# Patient Record
Sex: FEMALE | Race: WHITE | NOT HISPANIC OR LATINO | URBAN - METROPOLITAN AREA
[De-identification: names, ages, dates, MRNs, and addresses within clinical notes are randomized per-mention and may not be internally consistent; named-entity substitution may affect disease eponyms.]

---

## 2018-03-02 ENCOUNTER — AMBULATORY CONVERSION ENCOUNTER (OUTPATIENT)
Dept: FAMILY MEDICINE | Facility: CLINIC | Age: 43
End: 2018-03-02

## 2018-03-06 ENCOUNTER — TELEPHONE (OUTPATIENT)
Dept: FAMILY MEDICINE | Facility: CLINIC | Age: 43
End: 2018-03-06

## 2018-03-06 NOTE — TELEPHONE ENCOUNTER
Please let her know all labs are coming out normal including iron studies, thyroid, vit D and B12 and lyme titer

## 2018-05-21 ENCOUNTER — TELEPHONE (OUTPATIENT)
Dept: FAMILY MEDICINE | Facility: CLINIC | Age: 43
End: 2018-05-21

## 2018-05-21 RX ORDER — FLUOXETINE 20 MG/1
20 TABLET ORAL DAILY
Qty: 90 TABLET | Refills: 0 | Status: SHIPPED | OUTPATIENT
Start: 2018-05-21 | End: 2018-09-07 | Stop reason: SDUPTHER

## 2018-05-21 RX ORDER — FLUOXETINE 20 MG/1
20 TABLET ORAL
COMMUNITY
Start: 2017-10-30 | End: 2018-05-21 | Stop reason: SDUPTHER

## 2018-09-10 RX ORDER — FLUOXETINE 20 MG/1
TABLET ORAL
Qty: 90 TABLET | Refills: 0 | Status: SHIPPED | OUTPATIENT
Start: 2018-09-10 | End: 2019-01-15 | Stop reason: SDUPTHER

## 2018-10-31 ENCOUNTER — OFFICE VISIT (OUTPATIENT)
Dept: FAMILY MEDICINE | Facility: CLINIC | Age: 43
End: 2018-10-31
Payer: COMMERCIAL

## 2018-10-31 VITALS
SYSTOLIC BLOOD PRESSURE: 120 MMHG | RESPIRATION RATE: 16 BRPM | HEART RATE: 72 BPM | BODY MASS INDEX: 24.3 KG/M2 | OXYGEN SATURATION: 98 % | TEMPERATURE: 98.2 F | DIASTOLIC BLOOD PRESSURE: 62 MMHG | WEIGHT: 154.8 LBS | HEIGHT: 67 IN

## 2018-10-31 DIAGNOSIS — Z00.00 ROUTINE MEDICAL EXAM: Primary | ICD-10-CM

## 2018-10-31 DIAGNOSIS — G43.009 MIGRAINE WITHOUT AURA AND WITHOUT STATUS MIGRAINOSUS, NOT INTRACTABLE: ICD-10-CM

## 2018-10-31 DIAGNOSIS — Z23 NEED FOR VACCINATION: ICD-10-CM

## 2018-10-31 DIAGNOSIS — F41.9 ANXIETY: ICD-10-CM

## 2018-10-31 PROCEDURE — 99396 PREV VISIT EST AGE 40-64: CPT | Mod: 25 | Performed by: NURSE PRACTITIONER

## 2018-10-31 PROCEDURE — 90686 IIV4 VACC NO PRSV 0.5 ML IM: CPT | Performed by: NURSE PRACTITIONER

## 2018-10-31 PROCEDURE — 90471 IMMUNIZATION ADMIN: CPT | Performed by: NURSE PRACTITIONER

## 2018-10-31 RX ORDER — BUTALBITAL, ACETAMINOPHEN AND CAFFEINE 50; 325; 40 MG/1; MG/1; MG/1
1 TABLET ORAL EVERY 6 HOURS PRN
Qty: 15 TABLET | Refills: 0 | Status: SHIPPED | OUTPATIENT
Start: 2018-10-31 | End: 2019-11-06 | Stop reason: SDUPTHER

## 2018-10-31 RX ORDER — ZOLPIDEM TARTRATE 5 MG/1
5 TABLET ORAL NIGHTLY PRN
COMMUNITY
Start: 2018-01-26 | End: 2019-05-21 | Stop reason: ALTCHOICE

## 2018-10-31 RX ORDER — SUMATRIPTAN SUCCINATE 50 MG/1
50 TABLET ORAL ONCE AS NEEDED
Qty: 12 TABLET | Refills: 0 | Status: SHIPPED | OUTPATIENT
Start: 2018-10-31 | End: 2019-11-06 | Stop reason: SDUPTHER

## 2018-10-31 RX ORDER — SUMATRIPTAN SUCCINATE 50 MG/1
50 TABLET ORAL ONCE AS NEEDED
COMMUNITY
Start: 2018-01-23 | End: 2018-10-31 | Stop reason: SDUPTHER

## 2018-10-31 RX ORDER — BUTALBITAL, ACETAMINOPHEN AND CAFFEINE 50; 325; 40 MG/1; MG/1; MG/1
TABLET ORAL
COMMUNITY
Start: 2018-01-23 | End: 2018-10-31 | Stop reason: SDUPTHER

## 2018-10-31 ASSESSMENT — ENCOUNTER SYMPTOMS
TROUBLE SWALLOWING: 0
CHILLS: 0
FATIGUE: 0
BRUISES/BLEEDS EASILY: 0
COUGH: 0
LIGHT-HEADEDNESS: 0
PALPITATIONS: 0
NECK STIFFNESS: 0
ABDOMINAL PAIN: 0
ADENOPATHY: 0
DIZZINESS: 0
NECK PAIN: 0
SHORTNESS OF BREATH: 0
FEVER: 0
PHOTOPHOBIA: 0
ARTHRALGIAS: 0
HEADACHES: 1
DYSURIA: 0

## 2018-10-31 NOTE — PROGRESS NOTES
"Subjective      Patient ID: Rehana Torres is a 43 y.o. female.  1975      Patient presents for a physical.  Diet is healthy, well-balanced.   Does not formally exercise.  Works full-time as a  at home.   with 3 kids.    Menses is regular, sees [facility] and completed pap 2 weeks ago.  Mammogram completed and due again in February.  Sleeps well at night.    Migraines: Migraines on and off, may be 1-2 months a month and then other months does not get a migraine at all.  Uses imitrex and fioricet PRN.      Anxiety:  Symptoms well-controlled, no side effects.          The following have been reviewed and updated as appropriate in this visit:  Tobacco  Allergies  Meds  Problems  Surg Hx  Fam Hx  Soc Hx      Review of Systems   Constitutional: Negative for chills, fatigue and fever.   HENT: Negative for trouble swallowing.         Dental exam up to date   Eyes: Negative for photophobia and visual disturbance.        Wears glasses/contacts, up to date with eye exam.   Respiratory: Negative for cough and shortness of breath.    Cardiovascular: Negative for chest pain, palpitations and leg swelling.   Gastrointestinal: Negative for abdominal pain.   Genitourinary: Negative for dysuria and urgency.   Musculoskeletal: Negative for arthralgias, neck pain and neck stiffness.   Skin: Negative for rash.   Neurological: Positive for headaches. Negative for dizziness and light-headedness.   Hematological: Negative for adenopathy. Does not bruise/bleed easily.       Objective     Vitals:    10/31/18 1114   BP: 120/62   BP Location: Right upper arm   Patient Position: Sitting   Pulse: 72   Resp: 16   Temp: 36.8 °C (98.2 °F)   TempSrc: Oral   SpO2: 98%   Weight: 70.2 kg (154 lb 12.8 oz)   Height: 1.702 m (5' 7\")     Body mass index is 24.25 kg/m².    Physical Exam   Constitutional: She appears well-developed and well-nourished.   HENT:   Head: Normocephalic and atraumatic.   Right Ear: " Tympanic membrane, external ear and ear canal normal.   Left Ear: Tympanic membrane, external ear and ear canal normal.   Nose: Nose normal.   Mouth/Throat: Oropharynx is clear and moist. No oropharyngeal exudate.   Eyes: Conjunctivae and EOM are normal.   Neck: Normal range of motion. Neck supple.   Cardiovascular: Normal rate, regular rhythm, normal heart sounds and intact distal pulses.    Pulmonary/Chest: Effort normal and breath sounds normal.   Abdominal: Soft. Bowel sounds are normal. There is no tenderness.   Musculoskeletal: Normal range of motion.   Lymphadenopathy:     She has no cervical adenopathy.   Neurological: She is alert.   Skin: Skin is warm and dry.   Psychiatric: She has a normal mood and affect. Her behavior is normal. Judgment and thought content normal.       Assessment/Plan   Problem List Items Addressed This Visit     Migraine     Uses Imitrex PRN and then uses Fioricet if no relief.         Relevant Medications    SUMAtriptan (IMITREX) 50 mg tablet    butalbital-acetaminophen-caff (FIORICET, ESGIC) -40 mg per tablet    Anxiety     Stable on prozac 20mg,  follow up 1 year.           Other Visit Diagnoses     Routine medical exam    -  Primary    Doing well. Labs completed last year and were normal.  Flu shot administered. Up to date with gyn, mammo, dental and eye.    Need for vaccination        Relevant Orders    Influenza vaccine quadrivalent preservative free 6 mon and older IM (FluLaval) (Completed)

## 2018-10-31 NOTE — PATIENT INSTRUCTIONS
Continue with healthy diet and exercise.  Try to get at least 30-40 minutes of aerobic exercise daily.  Up  To date with dental and eye exams.  Up to date with gyn/mammo.  Up to date with immunizations: Flu, Tdap due for next year   Follow up in 1 year.

## 2019-05-21 ENCOUNTER — APPOINTMENT (OUTPATIENT)
Dept: LAB | Age: 44
End: 2019-05-21
Attending: NURSE PRACTITIONER
Payer: COMMERCIAL

## 2019-05-21 ENCOUNTER — OFFICE VISIT (OUTPATIENT)
Dept: FAMILY MEDICINE | Facility: CLINIC | Age: 44
End: 2019-05-21
Payer: COMMERCIAL

## 2019-05-21 ENCOUNTER — HOSPITAL ENCOUNTER (OUTPATIENT)
Dept: RADIOLOGY | Age: 44
Discharge: HOME | End: 2019-05-21
Attending: NURSE PRACTITIONER
Payer: COMMERCIAL

## 2019-05-21 VITALS
WEIGHT: 155.8 LBS | BODY MASS INDEX: 24.45 KG/M2 | HEART RATE: 77 BPM | HEIGHT: 67 IN | SYSTOLIC BLOOD PRESSURE: 130 MMHG | OXYGEN SATURATION: 98 % | RESPIRATION RATE: 16 BRPM | DIASTOLIC BLOOD PRESSURE: 72 MMHG | TEMPERATURE: 98.2 F

## 2019-05-21 DIAGNOSIS — R20.2 NUMBNESS AND TINGLING OF RIGHT ARM: Primary | ICD-10-CM

## 2019-05-21 DIAGNOSIS — R20.0 NUMBNESS AND TINGLING OF RIGHT ARM: ICD-10-CM

## 2019-05-21 DIAGNOSIS — M54.6 ACUTE RIGHT-SIDED THORACIC BACK PAIN: ICD-10-CM

## 2019-05-21 DIAGNOSIS — R20.2 NUMBNESS AND TINGLING OF RIGHT ARM: ICD-10-CM

## 2019-05-21 DIAGNOSIS — M54.2 NECK PAIN: ICD-10-CM

## 2019-05-21 DIAGNOSIS — R20.0 NUMBNESS AND TINGLING OF RIGHT ARM: Primary | ICD-10-CM

## 2019-05-21 LAB
25(OH)D3 SERPL-MCNC: 35 NG/ML (ref 30–100)
ALBUMIN SERPL-MCNC: 4.4 G/DL (ref 3.4–5)
ALP SERPL-CCNC: 31 IU/L (ref 35–126)
ALT SERPL-CCNC: 12 IU/L (ref 11–54)
ANION GAP SERPL CALC-SCNC: 10 MEQ/L (ref 3–15)
AST SERPL-CCNC: 15 IU/L (ref 15–41)
BILIRUB SERPL-MCNC: 0.6 MG/DL (ref 0.3–1.2)
BUN SERPL-MCNC: 9 MG/DL (ref 8–20)
CALCIUM SERPL-MCNC: 9.3 MG/DL (ref 8.9–10.3)
CHLORIDE SERPL-SCNC: 104 MEQ/L (ref 98–109)
CO2 SERPL-SCNC: 25 MEQ/L (ref 22–32)
CREAT SERPL-MCNC: 0.8 MG/DL
ERYTHROCYTE [DISTWIDTH] IN BLOOD BY AUTOMATED COUNT: 14.1 % (ref 11.7–14.4)
GFR SERPL CREATININE-BSD FRML MDRD: >60 ML/MIN/1.73M*2
GLUCOSE SERPL-MCNC: 77 MG/DL (ref 70–99)
HCT VFR BLDCO AUTO: 39.1 %
HGB BLD-MCNC: 12.8 G/DL
MCH RBC QN AUTO: 30.2 PG (ref 28–33.2)
MCHC RBC AUTO-ENTMCNC: 32.7 G/DL (ref 32.2–35.5)
MCV RBC AUTO: 92.2 FL (ref 83–98)
PDW BLD AUTO: 12.3 FL (ref 9.4–12.3)
PLATELET # BLD AUTO: 230 K/UL
POTASSIUM SERPL-SCNC: 3.8 MEQ/L (ref 3.6–5.1)
PROT SERPL-MCNC: 6.6 G/DL (ref 6–8.2)
RBC # BLD AUTO: 4.24 M/UL (ref 3.93–5.22)
SODIUM SERPL-SCNC: 139 MEQ/L (ref 136–144)
TSH SERPL DL<=0.05 MIU/L-ACNC: 4.33 MIU/L (ref 0.34–5.6)
VIT B12 SERPL-MCNC: 391 PG/ML (ref 180–914)
WBC # BLD AUTO: 4.68 K/UL

## 2019-05-21 PROCEDURE — 72050 X-RAY EXAM NECK SPINE 4/5VWS: CPT

## 2019-05-21 PROCEDURE — 72070 X-RAY EXAM THORAC SPINE 2VWS: CPT

## 2019-05-21 PROCEDURE — 36415 COLL VENOUS BLD VENIPUNCTURE: CPT

## 2019-05-21 PROCEDURE — 80053 COMPREHEN METABOLIC PANEL: CPT

## 2019-05-21 PROCEDURE — 82607 VITAMIN B-12: CPT

## 2019-05-21 PROCEDURE — 99213 OFFICE O/P EST LOW 20 MIN: CPT | Performed by: NURSE PRACTITIONER

## 2019-05-21 PROCEDURE — 84443 ASSAY THYROID STIM HORMONE: CPT

## 2019-05-21 PROCEDURE — 85027 COMPLETE CBC AUTOMATED: CPT

## 2019-05-21 PROCEDURE — 82306 VITAMIN D 25 HYDROXY: CPT

## 2019-05-21 PROCEDURE — 86618 LYME DISEASE ANTIBODY: CPT

## 2019-05-21 ASSESSMENT — ENCOUNTER SYMPTOMS
FEVER: 0
PALPITATIONS: 0
NECK PAIN: 0
CHILLS: 0
COUGH: 0
ADENOPATHY: 0
BRUISES/BLEEDS EASILY: 0
TROUBLE SWALLOWING: 0
DIZZINESS: 0
NECK STIFFNESS: 0
SHORTNESS OF BREATH: 0
HEADACHES: 0
LIGHT-HEADEDNESS: 0
FATIGUE: 0

## 2019-05-21 NOTE — PROGRESS NOTES
Subjective      Patient ID: Rehana Torres is a 44 y.o. female.  1975      Patient presents for right arm tingling/numbness.  She reports symptoms started about 1 month.  She fracture her right great toe in February and was in a boot for while and then developed some neck pain.  She reports neck pain has since resolved, but she then started with right shoulder down to right hand tingling/numbness.  She reports symptoms were intermittent but now have been more constant.  She only has symptoms when walking or sitting up.   She also feels she has decreased  strength and did drop her coffee twice in [facility]. Patient is right handed and did start working full time in January and most sits at a computer.  She also has noticed that bilateral feet are also numb/tingling but that is intermittent. She also feels her right shoulder blade has a burning sensation.  She does have a history of B12 deficiency and previously had tingling/numbness.   She did try heat on the neck when she had neck pain otherwise has not tried anything OTC.            The following have been reviewed and updated as appropriate in this visit:  Allergies  Meds       Review of Systems   Constitutional: Negative for chills, fatigue and fever.   HENT: Negative for trouble swallowing.    Respiratory: Negative for cough and shortness of breath.    Cardiovascular: Negative for chest pain and palpitations.   Musculoskeletal: Negative for neck pain and neck stiffness.        Right shoulder blade burning pain and tingling numbness down right hand   Skin: Negative for rash.   Neurological: Negative for dizziness, light-headedness and headaches.   Hematological: Negative for adenopathy. Does not bruise/bleed easily.     @[email or web address]@  Past Medical History:   Diagnosis Date   • Broken toe 02/2019    Right great toe   • Migraines      Past Surgical History:   Procedure Laterality Date   • WISDOM TOOTH EXTRACTION       Social History     Social History   •  "Marital status:      Spouse name: N/A   • Number of children: N/A   • Years of education: N/A     Social History Main Topics   • Smoking status: Never Smoker   • Smokeless tobacco: Never Used   • Alcohol use Yes      Comment: socially   • Drug use: No   • Sexual activity: Yes     Partners: Male     Other Topics Concern   • None     Social History Narrative   • None     Objective     Vitals:    05/21/19 1256   BP: 130/72   BP Location: Left upper arm   Patient Position: Sitting   Pulse: 77   Resp: 16   Temp: 36.8 °C (98.2 °F)   TempSrc: Oral   SpO2: 98%   Weight: 70.7 kg (155 lb 12.8 oz)   Height: 1.702 m (5' 7\")     Body mass index is 24.4 kg/m².  Current Outpatient Prescriptions   Medication Sig Dispense Refill   • butalbital-acetaminophen-caff (FIORICET, ESGIC) -40 mg per tablet Take 1 tablet by mouth every 6 (six) hours as needed for headaches or migraine. 15 tablet 0   • SUMAtriptan (IMITREX) 50 mg tablet Take 1 tablet (50 mg total) by mouth once as needed for migraine. 12 tablet 0     No current facility-administered medications for this visit.        Physical Exam   Constitutional: She is oriented to person, place, and time. She appears well-developed and well-nourished.   HENT:   Head: Normocephalic and atraumatic.   Eyes: EOM are normal.   Neck: Normal range of motion.   Cardiovascular: Normal rate, regular rhythm and normal heart sounds.    Pulmonary/Chest: Effort normal.   Musculoskeletal: Normal range of motion.        Cervical back: She exhibits tenderness and pain. She exhibits no bony tenderness, no swelling, no edema, no deformity, no laceration, no spasm and normal pulse.        Thoracic back: She exhibits tenderness and pain. She exhibits no bony tenderness, no swelling, no edema, no deformity, no laceration, no spasm and normal pulse.        Back:    Strengths equal of BUE's; radial pulses equal; no temperature change noted;     Full ROM of cervical spine and right shoulder "   Neurological: She is alert and oriented to person, place, and time. She has normal strength. No cranial nerve deficit or sensory deficit.   Skin: Skin is warm and dry.   Psychiatric: She has a normal mood and affect. Her behavior is normal. Judgment and thought content normal.       Assessment/Plan     Problem List Items Addressed This Visit     None      Visit Diagnoses     Numbness and tingling of right arm    -  Primary    Etiology unclear? Will start by checking labs and xrays.      Relevant Orders    Comprehensive metabolic panel    CBC    Vitamin D 25 hydroxy    Vitamin B12    TSH w reflex FT4    Lyme EIA reflex WB    Neck pain        Etiology unclear? Will start by checking labs and xrays.      Relevant Orders    X-RAY CERVICAL SPINE COMPLETE 4 OR 5 VIEWS    Acute right-sided thoracic back pain        Etiology unclear? Will start by checking labs and xrays.      Relevant Orders    X-RAY THORACIC SPINE 2 VIEWS

## 2019-05-22 DIAGNOSIS — M50.30 DEGENERATIVE, INTERVERTEBRAL DISC, CERVICAL: Primary | ICD-10-CM

## 2019-05-22 LAB — B BURGDOR AB SER IA-ACNC: 0.33 RATIO

## 2019-05-22 RX ORDER — CYCLOBENZAPRINE HCL 10 MG
10 TABLET ORAL NIGHTLY PRN
Qty: 15 TABLET | Refills: 0 | Status: SHIPPED | OUTPATIENT
Start: 2019-05-22 | End: 2019-11-06 | Stop reason: ENTERED-IN-ERROR

## 2019-05-22 NOTE — PROGRESS NOTES
"Please let patient know that her xray of the thoracic spine shows some degenerative changes and a very subtle \"S\" shaped curve of her thoracic spine. "

## 2019-05-22 NOTE — PROGRESS NOTES
Please let patient know that xray of the cervical spine shows some straightening of the normal curve of her cervical spine which may be positional or related to a muscle spasm--we can try a muscle relaxer at night, but I would also like to see if we can get an MRI approved of cervical spine to get a better evaluation.  The muscle relaxer I would recommend would be flexeril 10mg at bedtime, may cause drowsiness.

## 2019-05-22 NOTE — PROGRESS NOTES
Please let patient know that overall her labs look good. Her B12 is at 391; but she is not deficient; she can try OTC B12 1000mcg daily to see if that helps    Lyme is still pending

## 2019-05-28 ENCOUNTER — TELEPHONE (OUTPATIENT)
Dept: FAMILY MEDICINE | Facility: CLINIC | Age: 44
End: 2019-05-28

## 2019-05-28 NOTE — TELEPHONE ENCOUNTER
LVM per Sue's note that MRI was denied and to try PT and if no improvements we will try to re-authorize an MRI

## 2019-05-28 NOTE — TELEPHONE ENCOUNTER
Please let patient that MRI was denied by her insurance; we can try physical therapy first to see if that will help; if still no improvement we can try to re-authorize MRI

## 2019-05-28 NOTE — TELEPHONE ENCOUNTER
MRI CERVICAL SPINE WO CONTRAST WAS DENIED:     Based on [facility] Spine Imaging Guidelines, we cannot approve this request. MRI might be supported in the evaluation of suspected or known spinal disease with one of the followin) failure to improve after a recent (within 3 months) 6 week trial of provider-directed treatment with clinical re-evaluation, or 2) any signs or symptoms such as significant motor weakness, malignancy, infection, cauda equina syndrome, for which conservative treatment is not needed. The clinical information received fails to support meeting these requirements and, therefore, the request is not indicated at this time.      Ordering doctor in [facility] is MLR. A [id number] can be done by calling [phone] using case # [id number] .

## 2019-05-30 ENCOUNTER — TELEPHONE (OUTPATIENT)
Dept: FAMILY MEDICINE | Facility: CLINIC | Age: 44
End: 2019-05-30

## 2019-05-30 DIAGNOSIS — M54.6 ACUTE RIGHT-SIDED THORACIC BACK PAIN: ICD-10-CM

## 2019-05-30 DIAGNOSIS — R20.0 NUMBNESS AND TINGLING OF RIGHT ARM: Primary | ICD-10-CM

## 2019-05-30 DIAGNOSIS — M54.2 NECK PAIN: ICD-10-CM

## 2019-05-30 DIAGNOSIS — R20.2 NUMBNESS AND TINGLING OF RIGHT ARM: Primary | ICD-10-CM

## 2019-05-30 NOTE — TELEPHONE ENCOUNTER
"Pt LVM @ 04:17; Pt states that she had gotten Anita missed call and states that she will go to PT but wants to know if she needs a script for it and where does Sue recommend that she go. Also, pt states that the numbness is increasing in her arms and states that she is now having numbness in here legs and feet. Pt states that she would to see a neurologist and wants to know where Sue recommends that she goes because she wants to \"get this figured out\"  "

## 2019-05-30 NOTE — TELEPHONE ENCOUNTER
TTP and referred her to [facility] for PT. Orders placed.     Neuro referrals given, patient reports that she does not need a referral in order to book appt. She will shop around for whom she wants to see.

## 2019-07-24 ENCOUNTER — TELEPHONE (OUTPATIENT)
Dept: FAMILY MEDICINE | Facility: CLINIC | Age: 44
End: 2019-07-24

## 2019-07-24 RX ORDER — FLUOXETINE HYDROCHLORIDE 20 MG/1
20 CAPSULE ORAL DAILY
Qty: 30 CAPSULE | Refills: 1 | Status: SHIPPED | OUTPATIENT
Start: 2019-07-24 | End: 2019-09-25 | Stop reason: SDUPTHER

## 2019-07-24 NOTE — TELEPHONE ENCOUNTER
Spoke with pt she states the reason she stopped her medication is because she was feeling better and her stress level had ultimately decreased and she admits that she realizes she should have just stayed on the medication knowing the ebs and flows of life. She would like to restart the medication due to life stresses ex (daughter recently diagnosed with scoliosis, her  has a chronic illness that he is up and down with, and also a great deal of things going on at work). Pt is aware we may start medication (Prozac 20 mg) and she may have to schedule a medication check in Sept

## 2019-07-24 NOTE — TELEPHONE ENCOUNTER
Elvie Thompson,    I will consult with Dr Green and Sue and see what they say and get back to you.

## 2019-07-24 NOTE — TELEPHONE ENCOUNTER
Melissa,     I called the patient to get her scheduled but the first openings aren't until September with [facility]. The patient doesn't want to wait this long and asked if this could be considered a same day since its effecting her day to day life. Please let me know what you think. Thanks.

## 2019-07-24 NOTE — TELEPHONE ENCOUNTER
Please schedule pt accordingly for appt, she would like to go back on a controlled medication she was taking previously and needs to be seen before we can do that.     Thanks

## 2019-07-24 NOTE — TELEPHONE ENCOUNTER
PLease let her know I sent in prozac, check in 1 month to let us know how she is doing and have her schedule routine physical if stable; if not will need sooner appt

## 2019-09-25 RX ORDER — FLUOXETINE HYDROCHLORIDE 20 MG/1
CAPSULE ORAL
Qty: 30 CAPSULE | Refills: 1 | Status: SHIPPED | OUTPATIENT
Start: 2019-09-25 | End: 2019-09-30 | Stop reason: SDUPTHER

## 2019-09-30 RX ORDER — FLUOXETINE HYDROCHLORIDE 20 MG/1
20 CAPSULE ORAL DAILY
Qty: 30 CAPSULE | Refills: 1 | Status: SHIPPED | OUTPATIENT
Start: 2019-09-30 | End: 2019-11-06 | Stop reason: SDUPTHER

## 2019-09-30 NOTE — TELEPHONE ENCOUNTER
Pt phoned in stating she just realized she needed a med check and the first available was November 6th, which she scheduled. Pt states she needs a refill now of the Prozac until her appt        Medicine Refill Request    Last Office Visit: 5/21/2019  Next Office Visit: 11/6/2019        Current Outpatient Prescriptions:   •  butalbital-acetaminophen-caff (FIORICET, ESGIC) -40 mg per tablet, Take 1 tablet by mouth every 6 (six) hours as needed for headaches or migraine., Disp: 15 tablet, Rfl: 0  •  cyclobenzaprine (FLEXERIL) 10 mg tablet, Take 1 tablet (10 mg total) by mouth nightly as needed for muscle spasms for up to 14 days., Disp: 15 tablet, Rfl: 0  •  FLUoxetine (PROzac) 20 mg capsule, TAKE 1 CAPSULE BY MOUTH EVERY DAY, Disp: 30 capsule, Rfl: 1  •  SUMAtriptan (IMITREX) 50 mg tablet, Take 1 tablet (50 mg total) by mouth once as needed for migraine., Disp: 12 tablet, Rfl: 0      BP Readings from Last 3 Encounters:   05/21/19 130/72   10/31/18 120/62       Recent Lab results:  Lab Results   Component Value Date    CHOL 176 07/29/2015   ,   Lab Results   Component Value Date    HDL 69 07/29/2015   ,   Lab Results   Component Value Date    LDLCALC 94 07/29/2015   ,   Lab Results   Component Value Date    TRIG 64 07/29/2015        Lab Results   Component Value Date    GLUCOSE 77 05/21/2019   ,   Lab Results   Component Value Date    HGBA1C 5.2 06/17/2016         Lab Results   Component Value Date    CREATININE 0.8 05/21/2019       Lab Results   Component Value Date    TSH 4.33 05/21/2019

## 2019-11-06 ENCOUNTER — OFFICE VISIT (OUTPATIENT)
Dept: FAMILY MEDICINE | Facility: CLINIC | Age: 44
End: 2019-11-06
Payer: COMMERCIAL

## 2019-11-06 VITALS
DIASTOLIC BLOOD PRESSURE: 78 MMHG | HEIGHT: 67 IN | WEIGHT: 155.4 LBS | TEMPERATURE: 98.1 F | OXYGEN SATURATION: 98 % | BODY MASS INDEX: 24.39 KG/M2 | SYSTOLIC BLOOD PRESSURE: 112 MMHG | HEART RATE: 76 BPM | RESPIRATION RATE: 16 BRPM

## 2019-11-06 DIAGNOSIS — F41.9 ANXIETY: ICD-10-CM

## 2019-11-06 DIAGNOSIS — Z00.00 ROUTINE PHYSICAL EXAMINATION: ICD-10-CM

## 2019-11-06 DIAGNOSIS — Z23 FLU VACCINE NEED: Primary | ICD-10-CM

## 2019-11-06 DIAGNOSIS — G43.009 MIGRAINE WITHOUT AURA AND WITHOUT STATUS MIGRAINOSUS, NOT INTRACTABLE: ICD-10-CM

## 2019-11-06 PROCEDURE — 90686 IIV4 VACC NO PRSV 0.5 ML IM: CPT | Performed by: FAMILY MEDICINE

## 2019-11-06 PROCEDURE — 90471 IMMUNIZATION ADMIN: CPT | Performed by: FAMILY MEDICINE

## 2019-11-06 PROCEDURE — 99213 OFFICE O/P EST LOW 20 MIN: CPT | Mod: 25 | Performed by: FAMILY MEDICINE

## 2019-11-06 RX ORDER — BUTALBITAL, ACETAMINOPHEN AND CAFFEINE 50; 325; 40 MG/1; MG/1; MG/1
1 TABLET ORAL EVERY 6 HOURS PRN
Qty: 15 TABLET | Refills: 1 | Status: SHIPPED | OUTPATIENT
Start: 2019-11-06 | End: 2022-05-10 | Stop reason: ALTCHOICE

## 2019-11-06 RX ORDER — SUMATRIPTAN SUCCINATE 50 MG/1
50 TABLET ORAL ONCE AS NEEDED
Qty: 12 TABLET | Refills: 1 | Status: SHIPPED | OUTPATIENT
Start: 2019-11-06 | End: 2024-01-30 | Stop reason: SDUPTHER

## 2019-11-06 RX ORDER — FLUOXETINE HYDROCHLORIDE 20 MG/1
20 CAPSULE ORAL DAILY
Qty: 30 CAPSULE | Refills: 3 | Status: SHIPPED | OUTPATIENT
Start: 2019-11-06 | End: 2020-07-17 | Stop reason: SDUPTHER

## 2019-11-06 ASSESSMENT — ENCOUNTER SYMPTOMS
RHINORRHEA: 0
EYE PAIN: 0
SORE THROAT: 0
WHEEZING: 0
FREQUENCY: 0
SINUS PAIN: 0
COUGH: 0
FEVER: 0
DIZZINESS: 0
HEADACHES: 1
BRUISES/BLEEDS EASILY: 0
PALPITATIONS: 0
CHILLS: 0
FATIGUE: 0
CHEST TIGHTNESS: 0
SINUS PRESSURE: 0
MYALGIAS: 0
SHORTNESS OF BREATH: 0
ARTHRALGIAS: 0
NERVOUS/ANXIOUS: 0
HEMATURIA: 0
ABDOMINAL PAIN: 0
DIARRHEA: 0
WEAKNESS: 0
CONSTIPATION: 0
SLEEP DISTURBANCE: 0

## 2019-11-06 NOTE — PROGRESS NOTES
Subjective      Patient ID: Rehana Torres is a 44 y.o. female.    She is here for med check. She has been doing ok overall. She does feel the headaches are coming back alittle more than her usual. Due for eye check next wk. She is not waiting to take any daily migraine med at this time. She feels she will get 3 migraines per month and will use either imitrex or fioricet which usually helps but sleeping it off is the best. She cannot say any new triggers but she is not eating as healthy and not as active as she should be. Needs refill on her meds.       The following have been reviewed and updated as appropriate in this visit:  Tobacco  Allergies  Meds  Problems  Med Hx  Surg Hx  Fam Hx       Review of Systems   Constitutional: Negative for chills, fatigue and fever.   HENT: Negative for ear pain, postnasal drip, rhinorrhea, sinus pressure, sinus pain and sore throat.    Eyes: Negative for pain and visual disturbance.   Respiratory: Negative for cough, chest tightness, shortness of breath and wheezing.    Cardiovascular: Negative for chest pain and palpitations.   Gastrointestinal: Negative for abdominal pain, constipation and diarrhea.   Genitourinary: Negative for decreased urine volume, frequency and hematuria.   Musculoskeletal: Negative for arthralgias and myalgias.   Skin: Negative for rash.   Neurological: Positive for headaches. Negative for dizziness and weakness.   Hematological: Does not bruise/bleed easily.   Psychiatric/Behavioral: Negative for behavioral problems, sleep disturbance and suicidal ideas. The patient is not nervous/anxious.        Current Outpatient Medications   Medication Sig Dispense Refill   • butalbital-acetaminophen-caff (FIORICET, ESGIC) -40 mg per tablet Take 1 tablet by mouth every 6 (six) hours as needed for headaches or migraine. 15 tablet 1   • FLUoxetine (PROzac) 20 mg capsule Take 1 capsule (20 mg total) by mouth daily. 30 capsule 3   • SUMAtriptan  (IMITREX) 50 mg tablet Take 1 tablet (50 mg total) by mouth once as needed for migraine. 12 tablet 1     No current facility-administered medications for this visit.      Past Medical History:   Diagnosis Date   • Broken toe 02/2019    Right great toe   • Migraines      Family History   Problem Relation Age of Onset   • Diabetes Biological Father    • Hypertension Biological Father    • No Known Problems Biological Mother    • No Known Problems Biological Sister    • Hyperlipidemia Maternal Grandmother    • Hypertension Maternal Grandfather    • No Known Problems Paternal Grandmother    • Lung cancer Paternal Grandfather      Past Surgical History:   Procedure Laterality Date   • WISDOM TOOTH EXTRACTION       Social History     Socioeconomic History   • Marital status:      Spouse name: Not on file   • Number of children: Not on file   • Years of education: Not on file   • Highest education level: Not on file   Occupational History   • Not on file   Social Needs   • Financial resource strain: Not on file   • Food insecurity:     Worry: Not on file     Inability: Not on file   • Transportation needs:     Medical: Not on file     Non-medical: Not on file   Tobacco Use   • Smoking status: Never Smoker   • Smokeless tobacco: Never Used   Substance and Sexual Activity   • Alcohol use: Yes     Comment: socially   • Drug use: No   • Sexual activity: Yes     Partners: Male   Lifestyle   • Physical activity:     Days per week: Not on file     Minutes per session: Not on file   • Stress: Not on file   Relationships   • Social connections:     Talks on phone: Not on file     Gets together: Not on file     Attends Islam service: Not on file     Active member of club or organization: Not on file     Attends meetings of clubs or organizations: Not on file     Relationship status: Not on file   • Intimate partner violence:     Fear of current or ex partner: Not on file     Emotionally abused: Not on file     Physically  "abused: Not on file     Forced sexual activity: Not on file   Other Topics Concern   • Not on file   Social History Narrative   • Not on file     No Known Allergies    Objective   Visit Vitals  /78 (BP Location: Right upper arm, Patient Position: Sitting)   Pulse 76   Temp 36.7 °C (98.1 °F) (Oral)   Resp 16   Ht 1.702 m (5' 7\")   Wt 70.5 kg (155 lb 6.4 oz)   SpO2 98%   BMI 24.34 kg/m²     Physical Exam   Constitutional: She is oriented to person, place, and time. She appears well-developed and well-nourished.   HENT:   Head: Normocephalic and atraumatic.   Right Ear: External ear normal.   Left Ear: External ear normal.   Eyes: Pupils are equal, round, and reactive to light. Conjunctivae and EOM are normal.   Neck: Normal range of motion. Neck supple. No thyromegaly present.   Cardiovascular: Normal rate, regular rhythm and normal heart sounds.   No murmur heard.  Pulmonary/Chest: Effort normal and breath sounds normal. She has no wheezes.   Abdominal: Soft. Bowel sounds are normal. There is no tenderness.   Musculoskeletal: Normal range of motion. She exhibits no tenderness.   Lymphadenopathy:     She has no cervical adenopathy.   Neurological: She is alert and oriented to person, place, and time. No cranial nerve deficit.   Skin: Skin is warm and dry. No rash noted.   Psychiatric: She has a normal mood and affect.   Nursing note and vitals reviewed.      Assessment/Plan   Problem List Items Addressed This Visit        Nervous    Migraine    Relevant Medications    FLUoxetine (PROzac) 20 mg capsule    butalbital-acetaminophen-caff (FIORICET, ESGIC) -40 mg per tablet    SUMAtriptan (IMITREX) 50 mg tablet       Other    Anxiety    Relevant Medications    FLUoxetine (PROzac) 20 mg capsule      Other Visit Diagnoses     Flu vaccine need    -  Primary    Relevant Orders    Influenza vaccine quadrivalent preservative free 6 mon and older IM (FluLaval) (Completed)    Routine physical examination        " Relevant Orders    Comprehensive metabolic panel    TSH 3rd Generation    CBC    Lipid panel      She is doing well overall. She would like to hold on any medication for the migraines which would be daily meds at this time and will go back to headache diary and keep log and have recheck in few months. Will check fasting labs before her upcoming physical.     Lauren Green, DO  11/6/2019

## 2020-02-21 ENCOUNTER — OFFICE VISIT (OUTPATIENT)
Dept: FAMILY MEDICINE | Facility: CLINIC | Age: 45
End: 2020-02-21
Payer: COMMERCIAL

## 2020-02-21 VITALS
OXYGEN SATURATION: 98 % | HEIGHT: 67 IN | BODY MASS INDEX: 24.3 KG/M2 | SYSTOLIC BLOOD PRESSURE: 120 MMHG | WEIGHT: 154.8 LBS | DIASTOLIC BLOOD PRESSURE: 70 MMHG | TEMPERATURE: 98 F | HEART RATE: 67 BPM | RESPIRATION RATE: 16 BRPM

## 2020-02-21 DIAGNOSIS — Z23 NEED FOR TDAP VACCINATION: ICD-10-CM

## 2020-02-21 DIAGNOSIS — G43.009 MIGRAINE WITHOUT AURA AND WITHOUT STATUS MIGRAINOSUS, NOT INTRACTABLE: ICD-10-CM

## 2020-02-21 DIAGNOSIS — F41.9 ANXIETY: ICD-10-CM

## 2020-02-21 DIAGNOSIS — Z00.00 ROUTINE PHYSICAL EXAMINATION: Primary | ICD-10-CM

## 2020-02-21 PROCEDURE — 90471 IMMUNIZATION ADMIN: CPT | Performed by: FAMILY MEDICINE

## 2020-02-21 PROCEDURE — 90715 TDAP VACCINE 7 YRS/> IM: CPT | Performed by: FAMILY MEDICINE

## 2020-02-21 PROCEDURE — 99396 PREV VISIT EST AGE 40-64: CPT | Mod: 25 | Performed by: FAMILY MEDICINE

## 2020-02-21 ASSESSMENT — ENCOUNTER SYMPTOMS
SORE THROAT: 0
CONSTIPATION: 0
SHORTNESS OF BREATH: 0
DIARRHEA: 0
PALPITATIONS: 0
RHINORRHEA: 0
EYE PAIN: 0
HEADACHES: 1
ABDOMINAL PAIN: 0
SINUS PRESSURE: 0
MYALGIAS: 0
NERVOUS/ANXIOUS: 0
BRUISES/BLEEDS EASILY: 0
FEVER: 0
CHEST TIGHTNESS: 0
HEMATURIA: 0
FREQUENCY: 0
COUGH: 0
WHEEZING: 0
ARTHRALGIAS: 0
DIZZINESS: 0
CHILLS: 0
SINUS PAIN: 0
FATIGUE: 0
WEAKNESS: 0

## 2020-02-21 NOTE — PROGRESS NOTES
Subjective      Patient ID: Rehana Torres is a 45 y.o. female.    She is here for physical. Pt has been doing well overall. She is following well balanced meals. Stays well hydrated. She is active with moving around but no formal activity. Average 7hrs of sleep a night. Normal BMs and urination. Normal menstrual cycles. UTD with gyn care. UTD with mammo. UTD with eye and dental exam. UTD with flu shot. Due for Tdap. Due for fasting labs. She is getting about 4 migraines a month and controlling them at this time.       The following have been reviewed and updated as appropriate in this visit:  Tobacco  Allergies  Meds  Problems  Med Hx  Surg Hx  Fam Hx       Review of Systems   Constitutional: Negative for chills, fatigue and fever.   HENT: Negative for ear pain, postnasal drip, rhinorrhea, sinus pressure, sinus pain and sore throat.    Eyes: Negative for pain and visual disturbance.   Respiratory: Negative for cough, chest tightness, shortness of breath and wheezing.    Cardiovascular: Negative for chest pain and palpitations.   Gastrointestinal: Negative for abdominal pain, constipation and diarrhea.   Genitourinary: Negative for decreased urine volume, frequency and hematuria.   Musculoskeletal: Negative for arthralgias and myalgias.   Skin: Negative for rash.   Neurological: Positive for headaches. Negative for dizziness and weakness.   Hematological: Does not bruise/bleed easily.   Psychiatric/Behavioral: Negative for behavioral problems. The patient is not nervous/anxious.        Current Outpatient Medications   Medication Sig Dispense Refill   • butalbital-acetaminophen-caff (FIORICET, ESGIC) -40 mg per tablet Take 1 tablet by mouth every 6 (six) hours as needed for headaches or migraine. 15 tablet 1   • FLUoxetine (PROzac) 20 mg capsule Take 1 capsule (20 mg total) by mouth daily. 30 capsule 3   • SUMAtriptan (IMITREX) 50 mg tablet Take 1 tablet (50 mg total) by mouth once as needed for  migraine. 12 tablet 1     No current facility-administered medications for this visit.      Past Medical History:   Diagnosis Date   • Broken toe 02/2019    Right great toe   • Migraines      Family History   Problem Relation Age of Onset   • Diabetes Biological Father    • Hypertension Biological Father    • No Known Problems Biological Mother    • No Known Problems Biological Sister    • Hyperlipidemia Maternal Grandmother    • Hypertension Maternal Grandfather    • No Known Problems Paternal Grandmother    • Lung cancer Paternal Grandfather      Past Surgical History:   Procedure Laterality Date   • WISDOM TOOTH EXTRACTION       Social History     Socioeconomic History   • Marital status:      Spouse name: Not on file   • Number of children: Not on file   • Years of education: Not on file   • Highest education level: Not on file   Occupational History   • Not on file   Social Needs   • Financial resource strain: Not on file   • Food insecurity:     Worry: Not on file     Inability: Not on file   • Transportation needs:     Medical: Not on file     Non-medical: Not on file   Tobacco Use   • Smoking status: Never Smoker   • Smokeless tobacco: Never Used   Substance and Sexual Activity   • Alcohol use: Yes     Comment: socially   • Drug use: No   • Sexual activity: Yes     Partners: Male   Lifestyle   • Physical activity:     Days per week: Not on file     Minutes per session: Not on file   • Stress: Not on file   Relationships   • Social connections:     Talks on phone: Not on file     Gets together: Not on file     Attends Jainism service: Not on file     Active member of club or organization: Not on file     Attends meetings of clubs or organizations: Not on file     Relationship status: Not on file   • Intimate partner violence:     Fear of current or ex partner: Not on file     Emotionally abused: Not on file     Physically abused: Not on file     Forced sexual activity: Not on file   Other Topics  "Concern   • Not on file   Social History Narrative   • Not on file     No Known Allergies    Objective   Visit Vitals  /70 (BP Location: Right upper arm, Patient Position: Sitting)   Pulse 67   Temp 36.7 °C (98 °F) (Oral)   Resp 16   Ht 1.702 m (5' 7\")   Wt 70.2 kg (154 lb 12.8 oz)   SpO2 98%   BMI 24.25 kg/m²     Physical Exam   Constitutional: She is oriented to person, place, and time. She appears well-developed and well-nourished.   HENT:   Head: Normocephalic and atraumatic.   Right Ear: External ear normal.   Left Ear: External ear normal.   Eyes: Pupils are equal, round, and reactive to light. Conjunctivae are normal.   Neck: Normal range of motion. Neck supple. No thyromegaly present.   Cardiovascular: Normal rate, regular rhythm and normal heart sounds.   No murmur heard.  Pulmonary/Chest: Effort normal and breath sounds normal. She has no wheezes.   Abdominal: Soft. Bowel sounds are normal. There is no tenderness.   Musculoskeletal: Normal range of motion. She exhibits no tenderness.   Lymphadenopathy:     She has no cervical adenopathy.   Neurological: She is alert and oriented to person, place, and time. No cranial nerve deficit.   Skin: Skin is warm and dry. No rash noted.   Psychiatric: She has a normal mood and affect.   Nursing note and vitals reviewed.      Assessment/Plan   Problem List Items Addressed This Visit        Nervous    Migraine       Other    Anxiety      Other Visit Diagnoses     Routine physical examination    -  Primary    Relevant Orders    Comprehensive metabolic panel    TSH 3rd Generation    CBC    Lipid panel    Need for Tdap vaccination        Relevant Orders    Tdap vaccine greater than or equal to 6yo IM (Completed)      She is doing well overall. Discussed diet and exercise. UTD with eye and dental exam. UTD with gyn care. Will check fasting labs. UTD with flu shot. Will get Tdap. She will cont prozac and she would like to cont to monitor her migraines and cont her " current medications and look into seeing neuro or trying daily med but for now will see how she feels over next few months.     Lauren Green, DO  2/21/2020

## 2020-03-15 ENCOUNTER — TELEPHONE (OUTPATIENT)
Dept: FAMILY MEDICINE | Facility: CLINIC | Age: 45
End: 2020-03-15

## 2020-03-15 RX ORDER — TOBRAMYCIN 3 MG/ML
2 SOLUTION/ DROPS OPHTHALMIC 3 TIMES DAILY
Qty: 5 ML | Refills: 0 | Status: SHIPPED | OUTPATIENT
Start: 2020-03-15 | End: 2020-03-22

## 2020-03-15 NOTE — TELEPHONE ENCOUNTER
ON CALL  Phone call with patient woke up this right eye crusty red and irritated.  She does wear contacts but did not sleep in them.  Will send drops to pharmacy  No contacts  Pt instructed if symptoms do not resolve she will need to be seen

## 2020-03-16 ENCOUNTER — TELEPHONE (OUTPATIENT)
Dept: FAMILY MEDICINE | Facility: CLINIC | Age: 45
End: 2020-03-16

## 2020-03-16 NOTE — TELEPHONE ENCOUNTER
Pt called and left a vm saying she has had a migraine for the past 3 days and is requesting something that will make her sleep. Pt said she has tried the Imitrex, but hasn't had any relief with that.     I left a vm asking pt to call back to clarify her PCP (if she is following Dr Green to [place] or changing to someone else in [place]) so that the message can be addressed by the correct provider.

## 2020-07-17 ENCOUNTER — TELEPHONE (OUTPATIENT)
Dept: PRIMARY CARE | Facility: CLINIC | Age: 45
End: 2020-07-17

## 2020-07-17 DIAGNOSIS — F41.9 ANXIETY: ICD-10-CM

## 2020-07-17 RX ORDER — FLUOXETINE HYDROCHLORIDE 20 MG/1
20 CAPSULE ORAL DAILY
Qty: 90 CAPSULE | Refills: 1 | Status: SHIPPED | OUTPATIENT
Start: 2020-07-17 | End: 2021-01-18

## 2020-07-17 NOTE — TELEPHONE ENCOUNTER
Do you have enough medication for the next 5 days?: No    Did you request this medication through your pharmacy or our patient portal in the last day or two? Yes    Name of medication requested: Fluoxetine  Medication Strength: 20mg  Mediation Directions: 1 per day  Quantity Requested (example 30/90): 30  Number of refills requested: 1      System Generated Preferred Pharmacy(s)  are as follows.  If more than one pharmacy displays please identify which one should be used for this call    Has the pharmacy information below been confirmed with the patient?  Yes      [id number] [place] - [address] AT [place] [address]  [place] [address]  Phone: [phone] Fax: [phone]           If necessary, provide pharmacy details below. N/A     Is this pharmacy a mail order pharmacy?:   Pharmacy Name:   Pharmacy City:   Pharmacy Telephone:     Additional Comments: Patient is staying with Dr. Green in \[facility]\""    Next Encounter with this provider: Visit date not found

## 2020-11-02 ENCOUNTER — PATIENT OUTREACH (OUTPATIENT)
Dept: CASE MANAGEMENT | Facility: CLINIC | Age: 45
End: 2020-11-02

## 2020-11-02 ENCOUNTER — TELEPHONE (OUTPATIENT)
Dept: PRIMARY CARE | Facility: CLINIC | Age: 45
End: 2020-11-02

## 2020-11-02 NOTE — TELEPHONE ENCOUNTER
Mrs Torres said she was hospitalized in [facility] last week for spinal fusion and had a CT scan completed; on the CT scan they found that she had Renal Cancer. Mrs. Torres just wanted to make Dr. Green aware of what's going and would be happy to schedule a TeleMed appointment if necessary.

## 2020-11-02 NOTE — TELEPHONE ENCOUNTER
Patient is now scheduled for Telemedicine appointment with Dr. Green on Wednesday, 11/4/20 at 10:00 AM. Patient was made aware to expect a phone call from medical assistant 15 minutes prior to appointment time. Patient stated understanding.

## 2020-11-02 NOTE — PROGRESS NOTES
SAÚL Shaffer [facility] Rehana ELLSWORTH Brian 1975 10/25/2020 M84.48XA Pathological fracture, other site, initial encounter for fracture 10/30/2020 Stable burst fracture of unspecified lumbar vertebra, initial encounter for closed fracture Discharged to home or self-care (routine discharge)     TCM # 1. No answer. Left message on machine with R N contact information.

## 2020-11-03 NOTE — PROGRESS NOTES
NAME: Rehana Torres    MRN: [id number]    YOB: 1975    EVENT DETAILS    Discharging Facility: [facility]  Date of Admission: 10/25/20  Date of Discharge: 10/30/20            Reason for Admission: Lumbar Burst Fracture, Left Renal Mass     I made 2 attempts for a TCM post hospital discharge outreach to this patient. The attempts were made within the 48 hour period post D/C. I was not able to speak with the patient but the attempts satisfied the initial requirement for transitions of care.     TCM appointment scheduled for 11/04/2020.    PLAN OF CARE:    RN phone number given for future care management needs.       Danna Glez RN

## 2020-11-04 ENCOUNTER — TELEMEDICINE (OUTPATIENT)
Dept: PRIMARY CARE | Facility: CLINIC | Age: 45
End: 2020-11-04
Payer: COMMERCIAL

## 2020-11-04 DIAGNOSIS — N28.89 RENAL MASS, LEFT: Primary | ICD-10-CM

## 2020-11-04 DIAGNOSIS — S32.010D COMPRESSION FRACTURE OF L1 VERTEBRA WITH ROUTINE HEALING: ICD-10-CM

## 2020-11-04 PROCEDURE — 99496 TRANSJ CARE MGMT HIGH F2F 7D: CPT | Mod: 95 | Performed by: FAMILY MEDICINE

## 2020-11-04 RX ORDER — DIAZEPAM 5 MG/1
5 TABLET ORAL
COMMUNITY
Start: 2020-10-30 | End: 2020-11-06

## 2020-11-04 RX ORDER — GABAPENTIN 300 MG/1
300 CAPSULE ORAL 3 TIMES DAILY
COMMUNITY
Start: 2020-10-30 | End: 2020-11-29

## 2020-11-04 RX ORDER — PREDNISONE 20 MG/1
TABLET ORAL
COMMUNITY
Start: 2020-10-30 | End: 2022-05-10 | Stop reason: ALTCHOICE

## 2020-11-04 RX ORDER — OXYCODONE HYDROCHLORIDE 5 MG/1
5 TABLET ORAL
COMMUNITY
Start: 2020-10-30 | End: 2020-11-09

## 2020-11-04 RX ORDER — ACETAMINOPHEN 500 MG
1000 TABLET ORAL EVERY 6 HOURS
COMMUNITY
Start: 2020-10-30 | End: 2020-11-09

## 2020-11-04 RX ORDER — CELECOXIB 200 MG/1
200 CAPSULE ORAL 2 TIMES DAILY
COMMUNITY
Start: 2020-10-30 | End: 2020-11-09

## 2020-11-04 ASSESSMENT — ENCOUNTER SYMPTOMS
SINUS PRESSURE: 0
CHEST TIGHTNESS: 0
DIARRHEA: 0
COUGH: 0
SLEEP DISTURBANCE: 0
PALPITATIONS: 0
EYE PAIN: 0
ABDOMINAL PAIN: 0
SINUS PAIN: 0
HEMATURIA: 0
WHEEZING: 0
SORE THROAT: 0
FREQUENCY: 0
RHINORRHEA: 0
FEVER: 0
BRUISES/BLEEDS EASILY: 0
MYALGIAS: 0
SHORTNESS OF BREATH: 0
HEADACHES: 0
FATIGUE: 0
ARTHRALGIAS: 0
CHILLS: 0
NERVOUS/ANXIOUS: 0
CONSTIPATION: 0
WEAKNESS: 0
DIZZINESS: 0

## 2020-11-04 NOTE — PROGRESS NOTES
Verification of Patient Location:  The patient affirms they are currently located in the following state:[place]     Request for Consent:  You and I are about to have a telemedicine check-in or visit. This is allowed because you are already my patient, and you have requested it.  This telemedicine visit will be billed to your health insurance or you, if you are self-insured.  You understand you will be responsible for any copayments or coinsurances that apply to your telemedicine visit.  Before starting our telemedicine visit, I am required to get your consent for this virtual check-in or visit by telemedicine. Do you consent?      Patient Response to Request for Consent: Yes    The following have been reviewed and updated as appropriate in this visit:  Tobacco  Allergies  Meds  Problems  Med Hx  Surg Hx  Fam Hx         Visit Documentation:    Subjective      Patient ID: Rehana Torres is a 45 y.o. female.    Called and spoke to patient and her  for [facility] follow up over the phone instead of OV due COVID. She was dealing with R lower back and was seeing chiropracter and did some adjustments and then got R hip pain and did xray of lower spine and hip which looked ok and gave her prednisone and muscle relaxant but pain did not get better and went to ortho and was suppose to get hip injection but pain got worse with nausea and vomiting.  She to [facility] ER and had CT abdomen/pelvis which showed left renal cell carcinoma and a malignant destructive mass L1 vertebral body extending into spinal canal and right neural foramen. She was transferred to [facility] on 10/25 until 10/30.   She underwent MRI cervical/lumbar/thoracic spine and xrays. She underwent CT chest, which showed no evidence of metastatic disease in the chest, but showed the large lytic lesion in the L1 vertebral body causing mass effect on the central canal. On 10/27 she went to the OR with neurosurgery and underwent  spinal angiogram and laurel embolization of Right L1 and L2 segmental feeders and Left L1 feeders to mass. On 10/28 she underwent posterior T11-L2 decompression of tumor with Dr. Kuo of orthopedics.  She received a bone scan which revealed which showed no evidence of metastatic bone disease. She with be following up with oncologist Dr. Barahona on 11/6 along with Dr Go (urology). She is also planning to have kidney removed at end of Nov. Urology recommended an outpt genetics referral which she will plan to get done. She is home now and feeling much better since surgery. She is taking it easy.         The following have been reviewed and updated as appropriate in this visit:  Tobacco  Allergies  Meds  Problems  Med Hx  Surg Hx  Fam Hx       Review of Systems   Constitutional: Negative for chills, fatigue and fever.   HENT: Negative for ear pain, postnasal drip, rhinorrhea, sinus pressure, sinus pain and sore throat.    Eyes: Negative for pain and visual disturbance.   Respiratory: Negative for cough, chest tightness, shortness of breath and wheezing.    Cardiovascular: Negative for chest pain and palpitations.   Gastrointestinal: Negative for abdominal pain, constipation and diarrhea.   Genitourinary: Negative for decreased urine volume, frequency and hematuria.   Musculoskeletal: Negative for arthralgias and myalgias.   Skin: Negative for rash.   Neurological: Negative for dizziness, weakness and headaches.   Hematological: Does not bruise/bleed easily.   Psychiatric/Behavioral: Negative for behavioral problems, sleep disturbance and suicidal ideas. The patient is not nervous/anxious.        Current Outpatient Medications   Medication Sig Dispense Refill   • acetaminophen (TYLENOL) 500 mg tablet Take 1,000 mg by mouth every 6 hours.     • celecoxib (celeBREX) 200 mg capsule Take 200 mg by mouth 2 times daily.     • diazePAM (VALIUM) 5 mg tablet Take 5 mg by mouth.     • gabapentin (NEURONTIN) 300 mg  capsule Take 300 mg by mouth 3 times daily.     • oxyCODONE (ROXICODONE) 5 mg immediate release tablet Take 5 mg by mouth.     • predniSONE (DELTASONE) 20 mg tablet Take 4 tabs (80mg) daily for 3 days, 3 tabs (60mg) daily for 3 days, then take 2 tabs (40mg) daily for 3 days, then take 1 tab (20mg) daily for 3 days.     • butalbital-acetaminophen-caff (FIORICET, ESGIC) -40 mg per tablet Take 1 tablet by mouth every 6 (six) hours as needed for headaches or migraine. 15 tablet 1   • FLUoxetine (PROzac) 20 mg capsule Take 1 capsule (20 mg total) by mouth daily. 90 capsule 1   • SUMAtriptan (IMITREX) 50 mg tablet Take 1 tablet (50 mg total) by mouth once as needed for migraine. 12 tablet 1     No current facility-administered medications for this visit.      Past Medical History:   Diagnosis Date   • Broken toe 02/2019    Right great toe   • Migraines      Family History   Problem Relation Age of Onset   • Diabetes Biological Father    • Hypertension Biological Father    • No Known Problems Biological Mother    • No Known Problems Biological Sister    • Hyperlipidemia Maternal Grandmother    • Hypertension Maternal Grandfather    • No Known Problems Paternal Grandmother    • Lung cancer Paternal Grandfather      Past Surgical History:   Procedure Laterality Date   • WISDOM TOOTH EXTRACTION       Social History     Socioeconomic History   • Marital status:      Spouse name: Not on file   • Number of children: Not on file   • Years of education: Not on file   • Highest education level: Not on file   Occupational History   • Not on file   Social Needs   • Financial resource strain: Not on file   • Food insecurity     Worry: Not on file     Inability: Not on file   • Transportation needs     Medical: Not on file     Non-medical: Not on file   Tobacco Use   • Smoking status: Never Smoker   • Smokeless tobacco: Never Used   Substance and Sexual Activity   • Alcohol use: Yes     Comment: socially   • Drug use: No    • Sexual activity: Yes     Partners: Male   Lifestyle   • Physical activity     Days per week: Not on file     Minutes per session: Not on file   • Stress: Not on file   Relationships   • Social connections     Talks on phone: Not on file     Gets together: Not on file     Attends Anglican service: Not on file     Active member of club or organization: Not on file     Attends meetings of clubs or organizations: Not on file     Relationship status: Not on file   • Intimate partner violence     Fear of current or ex partner: Not on file     Emotionally abused: Not on file     Physically abused: Not on file     Forced sexual activity: Not on file   Other Topics Concern   • Not on file   Social History Narrative   • Not on file     No Known Allergies    Objective   There were no vitals taken for this visit.  Physical Exam  Constitutional:       Appearance: She is well-developed.   Neck:      Thyroid: No thyromegaly.         Assessment/Plan   Problem List Items Addressed This Visit        Genitourinary    Renal mass, left - Primary       Musculoskeletal    Compression fracture of L1 vertebra with routine healing      She is home now and doing well overall. She has to go back for L kidney removal given 40% of tumor still there at end of month. She will cont follow up with her specialists and keep me updated. I will see her in f/u in 2 months or sooner if needed. She will get the genetic testing done as well. Cont current meds at this time.     Lauren Green DO  11/4/2020        Time Spent in Medical Discussion During This Encounter:  30 minutes

## 2020-12-04 ENCOUNTER — PATIENT OUTREACH (OUTPATIENT)
Dept: CASE MANAGEMENT | Facility: CLINIC | Age: 45
End: 2020-12-04

## 2020-12-08 NOTE — PROGRESS NOTES
NAME: Rehana Torres    MRN: [id number]    YOB: 1975    Event Review:          Discharge Diagnosis: Renal Cancer    Patient readmitted in the last 30 days: ([facility])     Date of Admission: 12/02/20  Date of Discharge: 12/03/20        2 messages left for pt with no return call. Pt admitted for elective left radical nephrectomy for renal cell cancer. Pt to f/u with oncology, urology, ortho and radiation oncology.     Nathalie Marion RN  [phone]

## 2021-04-13 DIAGNOSIS — Z23 ENCOUNTER FOR IMMUNIZATION: ICD-10-CM

## 2021-07-22 ENCOUNTER — TELEPHONE (OUTPATIENT)
Dept: PRIMARY CARE | Facility: CLINIC | Age: 46
End: 2021-07-22

## 2021-07-23 NOTE — TELEPHONE ENCOUNTER
LVM to call office, please put through. Per Dr. Green, schedule PE in the next few months, ok to override her schedule.

## 2021-07-26 NOTE — TELEPHONE ENCOUNTER
Looks like patient called to schedule and call center scheduled her for January even though my notes said to put her through to schedule in the next few months. Are you ok with her waiting until January or should I call her back and get her in sooner?

## 2022-05-10 ENCOUNTER — OFFICE VISIT (OUTPATIENT)
Dept: PRIMARY CARE | Facility: CLINIC | Age: 47
End: 2022-05-10
Payer: COMMERCIAL

## 2022-05-10 VITALS
OXYGEN SATURATION: 98 % | HEIGHT: 67 IN | DIASTOLIC BLOOD PRESSURE: 80 MMHG | HEART RATE: 111 BPM | RESPIRATION RATE: 20 BRPM | WEIGHT: 145 LBS | BODY MASS INDEX: 22.76 KG/M2 | TEMPERATURE: 97.9 F | SYSTOLIC BLOOD PRESSURE: 102 MMHG

## 2022-05-10 DIAGNOSIS — F41.9 ANXIETY: ICD-10-CM

## 2022-05-10 DIAGNOSIS — C79.51: ICD-10-CM

## 2022-05-10 DIAGNOSIS — C79.51 BONE METASTASES: ICD-10-CM

## 2022-05-10 DIAGNOSIS — Z00.00 ROUTINE PHYSICAL EXAMINATION: Primary | ICD-10-CM

## 2022-05-10 DIAGNOSIS — C64.2 RENAL CELL CARCINOMA OF LEFT KIDNEY (CMS/HCC): ICD-10-CM

## 2022-05-10 DIAGNOSIS — G43.009 MIGRAINE WITHOUT AURA AND WITHOUT STATUS MIGRAINOSUS, NOT INTRACTABLE: ICD-10-CM

## 2022-05-10 PROBLEM — N28.89 RENAL MASS, LEFT: Status: RESOLVED | Noted: 2020-10-25 | Resolved: 2022-05-10

## 2022-05-10 PROCEDURE — 3008F BODY MASS INDEX DOCD: CPT | Performed by: FAMILY MEDICINE

## 2022-05-10 PROCEDURE — 99396 PREV VISIT EST AGE 40-64: CPT | Performed by: FAMILY MEDICINE

## 2022-05-10 RX ORDER — ALPRAZOLAM 0.25 MG/1
0.25 TABLET ORAL 2 TIMES DAILY PRN
COMMUNITY
Start: 2022-02-04 | End: 2024-05-23 | Stop reason: ALTCHOICE

## 2022-05-10 RX ORDER — TRAMADOL HYDROCHLORIDE 50 MG/1
TABLET ORAL
COMMUNITY
Start: 2022-03-02 | End: 2022-05-10 | Stop reason: ENTERED-IN-ERROR

## 2022-05-10 RX ORDER — OXYCODONE HYDROCHLORIDE 5 MG/1
TABLET ORAL
COMMUNITY
Start: 2022-04-27

## 2022-05-10 RX ORDER — DULOXETIN HYDROCHLORIDE 20 MG/1
CAPSULE, DELAYED RELEASE ORAL
COMMUNITY
Start: 2022-05-03 | End: 2024-05-23 | Stop reason: ALTCHOICE

## 2022-05-10 RX ORDER — LISINOPRIL 10 MG/1
TABLET ORAL
COMMUNITY
Start: 2022-04-12 | End: 2024-05-23 | Stop reason: SDUPTHER

## 2022-05-10 RX ORDER — CALCIUM CARBONATE/VITAMIN D3 500 MG-10
TABLET,CHEWABLE ORAL
COMMUNITY
Start: 2022-01-24

## 2022-05-10 RX ORDER — CYCLOBENZAPRINE HCL 5 MG
5 TABLET ORAL 3 TIMES DAILY PRN
COMMUNITY
Start: 2021-09-15 | End: 2024-05-23 | Stop reason: ALTCHOICE

## 2022-05-10 ASSESSMENT — ENCOUNTER SYMPTOMS
RHINORRHEA: 0
CONSTIPATION: 0
DYSURIA: 0
FEVER: 0
ABDOMINAL PAIN: 0
HEMATURIA: 0
PALPITATIONS: 0
COUGH: 0
CHILLS: 0
ARTHRALGIAS: 0
SHORTNESS OF BREATH: 0
NERVOUS/ANXIOUS: 0
MYALGIAS: 0
FREQUENCY: 0
BLOOD IN STOOL: 0
HEADACHES: 0
SLEEP DISTURBANCE: 0
DIZZINESS: 0
WHEEZING: 0
FATIGUE: 0
SORE THROAT: 0

## 2022-05-10 ASSESSMENT — PATIENT HEALTH QUESTIONNAIRE - PHQ9
SUM OF ALL RESPONSES TO PHQ9 QUESTIONS 1 & 2: 2
SUM OF ALL RESPONSES TO PHQ QUESTIONS 1-9: 2

## 2022-05-10 NOTE — PROGRESS NOTES
Subjective      Patient ID: Rehana Torres is a 47 y.o. female.    She is here for physical. She has been doing ok overall. She is trying to follow well balanced meals but does not have great appetite and has more food aversions in the night so tries to eat more during the day. Stays well hydrated. She is active with her household chores and active with her 3 children. Average 8hrs of sleep a night. Normal BMs and urination. Due for colonoscopy. Has scheduled mammo in 2 wks. UTD with eye and dental exam. UTD with immunizations. She was diagnosed with renal cell carcinoma back in 2020 and is s/p spinal fusion secondary to mets to the spine and has had radiation to rib x 2 along with 4 cycles of ipi/nivo and cabozantinib added more recently. She lately has been dealing with more back pain and oncologist has ordered MRI lumbar spine and pelvis given history of bone mets along with MRI cervical spine and thoracic spine. She is planning to go back to her spinal surgeon soon as well. She is seeing psychiatrist and has her on cymbalta recently but doesn't really like the medication and thinking about going back on prozac. UTD with labs. UTD with eye and dental exam. UTD with gyn care/mammo. Due for colonoscopy.       The following have been reviewed and updated as appropriate in this visit:   Tobacco  Allergies  Meds  Problems  Surg Hx  Fam Hx         Review of Systems   Constitutional: Negative for chills, fatigue and fever.   HENT: Negative for ear discharge, ear pain, postnasal drip, rhinorrhea and sore throat.    Respiratory: Negative for cough, shortness of breath and wheezing.    Cardiovascular: Negative for chest pain and palpitations.   Gastrointestinal: Negative for abdominal pain, blood in stool and constipation.   Genitourinary: Negative for dysuria, frequency and hematuria.   Musculoskeletal: Negative for arthralgias and myalgias.   Skin: Negative for rash.   Neurological: Negative for dizziness and  headaches.   Psychiatric/Behavioral: Negative for sleep disturbance and suicidal ideas. The patient is not nervous/anxious.        Current Outpatient Medications   Medication Sig Dispense Refill   • ALPRAZolam (XANAX) 0.25 mg tablet Take 0.25 mg by mouth 2 (two) times a day as needed.     • cabozantinib 20 mg tablet Take  20 mg daily     • calcium carbonate-vitamin D3 500 mg-10 mcg (400 unit) chewable tablet CHEW 1 TABLET BY MOUTH EVERY DAY     • cyclobenzaprine (FLEXERIL) 5 mg tablet Take 5 mg by mouth 3 times daily as needed.     • DULoxetine (CYMBALTA) 20 mg capsule Take one capsule (20mg) by mouth daily     • lisinopriL (PRINIVIL) 10 mg tablet      • oxyCODONE (ROXICODONE) 5 mg immediate release tablet      • SUMAtriptan (IMITREX) 50 mg tablet Take 1 tablet (50 mg total) by mouth once as needed for migraine. 12 tablet 1   • zoledronic acid in sodium chloride 0.9 % 100 mL IVPB Follow up in 4wks 1 each 0     No current facility-administered medications for this visit.     Past Medical History:   Diagnosis Date   • Broken toe 02/2019    Right great toe   • Migraines      Family History   Problem Relation Age of Onset   • No Known Problems Biological Mother    • Diabetes Biological Father    • Hypertension Biological Father    • No Known Problems Biological Sister    • Hyperlipidemia Maternal Grandmother    • Hypertension Maternal Grandfather    • No Known Problems Paternal Grandmother    • Lung cancer Paternal Grandfather      Past Surgical History:   Procedure Laterality Date   • NEPHRECTOMY  12/2020    s/p renal cancer   • SPINAL FUSION  10/2020    s/p T-11-L2   • WISDOM TOOTH EXTRACTION       Social History     Socioeconomic History   • Marital status:      Spouse name: Not on file   • Number of children: 3   • Years of education: Not on file   • Highest education level: Not on file   Occupational History   • Occupation: Disability   Tobacco Use   • Smoking status: Never Smoker   • Smokeless tobacco:  "Never Used   Vaping Use   • Vaping Use: Never used   Substance and Sexual Activity   • Alcohol use: Yes     Comment: socially   • Drug use: No   • Sexual activity: Yes     Partners: Male   Other Topics Concern   • Not on file   Social History Narrative   • Not on file     Social Determinants of Health     Financial Resource Strain: Not on file   Food Insecurity: Not on file   Transportation Needs: Not on file   Physical Activity: Not on file   Stress: Not on file   Social Connections: Not on file   Intimate Partner Violence: Not on file   Housing Stability: Not on file     No Known Allergies    Objective   Visit Vitals  /80 (BP Location: Left upper arm, Patient Position: Sitting)   Pulse (!) 111   Temp 36.6 °C (97.9 °F) (Temporal)   Resp 20   Ht 1.702 m (5' 7\")   Wt 65.8 kg (145 lb)   LMP  (LMP Unknown)   SpO2 98%   BMI 22.71 kg/m²     Physical Exam  Vitals and nursing note reviewed.   Constitutional:       Appearance: Normal appearance.   HENT:      Right Ear: Tympanic membrane normal. There is no impacted cerumen.      Left Ear: Tympanic membrane normal. There is no impacted cerumen.      Nose: Nose normal. No rhinorrhea.      Mouth/Throat:      Mouth: Mucous membranes are moist.      Pharynx: Oropharynx is clear. No posterior oropharyngeal erythema.   Cardiovascular:      Rate and Rhythm: Normal rate and regular rhythm.      Heart sounds: No murmur heard.  Pulmonary:      Effort: Pulmonary effort is normal.      Breath sounds: Normal breath sounds. No wheezing.   Abdominal:      General: Bowel sounds are normal.      Palpations: Abdomen is soft.      Tenderness: There is no abdominal tenderness.   Musculoskeletal:         General: Normal range of motion.      Cervical back: Normal range of motion and neck supple.   Skin:     General: Skin is warm.      Findings: No rash.   Neurological:      General: No focal deficit present.      Mental Status: She is alert and oriented to person, place, and time.      " Cranial Nerves: No cranial nerve deficit.   Psychiatric:         Mood and Affect: Mood normal.         Behavior: Behavior normal.         Assessment/Plan   Problem List Items Addressed This Visit        Nervous    Migraine    Relevant Medications    oxyCODONE (ROXICODONE) 5 mg immediate release tablet    DULoxetine (CYMBALTA) 20 mg capsule    cyclobenzaprine (FLEXERIL) 5 mg tablet       Genitourinary    Renal cell carcinoma of left kidney (CMS/HCC)     Stable. 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 She will get updated imaging and f/u with spinal specialist           Relevant Medications    cabozantinib 20 mg tablet       Other    Anxiety      Other Visit Diagnoses     Routine physical examination    -  Primary      She is doing pretty good overall. Discussed diet and exercise. UTD with labs. UTD with eye and dental exam. UTD with gyn care/mammo. She will schedule colonoscopy soon. She will cont her current meds and specialists and if anything changes she will let me know! Will have f/u in 6-12 months.     Lauren Green, DO  5/10/2022

## 2022-05-10 NOTE — ASSESSMENT & PLAN NOTE
She is being followed closely by [facility] and with her oncologist

## 2024-05-23 ENCOUNTER — OFFICE VISIT (OUTPATIENT)
Dept: PRIMARY CARE | Facility: CLINIC | Age: 49
End: 2024-05-23
Payer: COMMERCIAL

## 2024-05-23 VITALS
TEMPERATURE: 97 F | SYSTOLIC BLOOD PRESSURE: 100 MMHG | BODY MASS INDEX: 18.66 KG/M2 | RESPIRATION RATE: 16 BRPM | HEIGHT: 69 IN | HEART RATE: 67 BPM | WEIGHT: 126 LBS | DIASTOLIC BLOOD PRESSURE: 78 MMHG | OXYGEN SATURATION: 97 %

## 2024-05-23 DIAGNOSIS — E03.9 ACQUIRED HYPOTHYROIDISM: ICD-10-CM

## 2024-05-23 DIAGNOSIS — C79.51: ICD-10-CM

## 2024-05-23 DIAGNOSIS — Z00.00 ROUTINE PHYSICAL EXAMINATION: Primary | ICD-10-CM

## 2024-05-23 DIAGNOSIS — C64.2 RENAL CELL CARCINOMA OF LEFT KIDNEY (CMS/HCC): ICD-10-CM

## 2024-05-23 DIAGNOSIS — G43.009 MIGRAINE WITHOUT AURA AND WITHOUT STATUS MIGRAINOSUS, NOT INTRACTABLE: ICD-10-CM

## 2024-05-23 PROBLEM — K21.9 GASTROESOPHAGEAL REFLUX DISEASE WITHOUT ESOPHAGITIS: Status: ACTIVE | Noted: 2024-05-23

## 2024-05-23 PROBLEM — I10 PRIMARY HYPERTENSION: Status: ACTIVE | Noted: 2024-05-23

## 2024-05-23 PROCEDURE — 99396 PREV VISIT EST AGE 40-64: CPT | Performed by: FAMILY MEDICINE

## 2024-05-23 PROCEDURE — 3078F DIAST BP <80 MM HG: CPT | Performed by: FAMILY MEDICINE

## 2024-05-23 PROCEDURE — 3074F SYST BP LT 130 MM HG: CPT | Performed by: FAMILY MEDICINE

## 2024-05-23 RX ORDER — LEVOTHYROXINE SODIUM 112 UG/1
112 TABLET ORAL
COMMUNITY

## 2024-05-23 RX ORDER — PANTOPRAZOLE SODIUM 40 MG/1
40 TABLET, DELAYED RELEASE ORAL DAILY
COMMUNITY

## 2024-05-23 RX ORDER — SUMATRIPTAN SUCCINATE 50 MG/1
50 TABLET ORAL ONCE AS NEEDED
Qty: 12 TABLET | Refills: 1 | Status: SHIPPED | OUTPATIENT
Start: 2024-05-23

## 2024-05-23 RX ORDER — MIRTAZAPINE 7.5 MG/1
7.5 TABLET, FILM COATED ORAL NIGHTLY
Start: 2024-05-23 | End: 2024-06-22

## 2024-05-23 RX ORDER — LISINOPRIL 10 MG/1
20 TABLET ORAL DAILY
Start: 2024-05-23

## 2024-05-23 RX ORDER — PEMBROLIZUMAB 25 MG/ML
200 INJECTION, SOLUTION INTRAVENOUS
COMMUNITY

## 2024-05-23 RX ORDER — MORPHINE SULFATE 30 MG/1
30 TABLET ORAL 2 TIMES DAILY
COMMUNITY

## 2024-05-23 RX ORDER — LENVATINIB 14 MG/DAY
14 KIT ORAL DAILY
COMMUNITY

## 2024-05-23 ASSESSMENT — ENCOUNTER SYMPTOMS
CHILLS: 0
MYALGIAS: 0
FREQUENCY: 0
NERVOUS/ANXIOUS: 0
COUGH: 0
BLOOD IN STOOL: 0
CONSTIPATION: 0
SLEEP DISTURBANCE: 0
SHORTNESS OF BREATH: 0
DYSURIA: 0
FATIGUE: 0
ABDOMINAL PAIN: 0
WHEEZING: 0
RHINORRHEA: 0
HEMATURIA: 0
HEADACHES: 0
PALPITATIONS: 0
SORE THROAT: 0
FEVER: 0
ARTHRALGIAS: 0
DIZZINESS: 0

## 2024-05-23 ASSESSMENT — PATIENT HEALTH QUESTIONNAIRE - PHQ9: SUM OF ALL RESPONSES TO PHQ9 QUESTIONS 1 & 2: 0

## 2024-05-23 NOTE — PROGRESS NOTES
Subjective      Patient ID: Rehana Torres is a 49 y.o. female.    She is here for physical. Pt has been doing pretty good overall. She is trying to follow well balanced meals. Stays well hydrated. She is active with her daily life. She was losing weight and did end up going on remeron which did help her appetite and increased her weight. Average 8hrs of sleep a night. Normal BMS (every other day) and urination. Due for colonoscopy. Doesn't get menstrual cycles since the medications.  UTD with gyn care. UTD with mammo. UTD with eye and dental exam. She is seeing specialists every 3wks and labs every 3wks and has been going ok overall.         The following have been reviewed and updated as appropriate in this visit:   Tobacco  Allergies  Meds  Problems  Med Hx  Surg Hx  Fam Hx       Review of Systems   Constitutional:  Negative for chills, fatigue and fever.   HENT:  Negative for ear discharge, ear pain, postnasal drip, rhinorrhea and sore throat.    Respiratory:  Negative for cough, shortness of breath and wheezing.    Cardiovascular:  Negative for chest pain and palpitations.   Gastrointestinal:  Negative for abdominal pain, blood in stool and constipation.   Genitourinary:  Negative for dysuria, frequency and hematuria.   Musculoskeletal:  Negative for arthralgias and myalgias.   Skin:  Negative for rash.   Neurological:  Negative for dizziness and headaches.   Psychiatric/Behavioral:  Negative for sleep disturbance and suicidal ideas. The patient is not nervous/anxious.        Current Outpatient Medications   Medication Sig Dispense Refill    calcium carbonate-vitamin D3 500 mg-10 mcg (400 unit) chewable tablet CHEW 1 TABLET BY MOUTH EVERY DAY      lenvatinib (LENVIMA) 14 mg/day(10 mg x 1-4 mg x 1) Take  14 mg daily      levothyroxine (SYNTHROID) 112 mcg tablet Take 112 mcg by mouth daily.      lisinopriL (PRINIVIL) 10 mg tablet Take 2 tablets (20 mg total) by mouth daily.      mirtazapine (REMERON)  7.5 mg tablet Take 1 tablet (7.5 mg total) by mouth nightly.      morphine (MSIR) 30 mg immediate release tablet Take 30 mg by mouth 2 (two) times a day.      oxyCODONE (ROXICODONE) 5 mg immediate release tablet       pantoprazole (PROTONIX) 40 mg EC tablet Take 40 mg by mouth daily.      pembrolizumab (KEYTRUDA) 25 mg/mL solution injection Infuse 200 mg into a venous catheter every 21 (twentyone) days.      SUMAtriptan (IMITREX) 50 mg tablet Take 1 tablet (50 mg total) by mouth once as needed for migraine. 12 tablet 1     No current facility-administered medications for this visit.     Past Medical History:   Diagnosis Date    Broken toe 02/2019    Right great toe    Migraines      Family History   Problem Relation Age of Onset    No Known Problems Biological Mother     Diabetes Biological Father     Hypertension Biological Father     No Known Problems Biological Sister     Hyperlipidemia Maternal Grandmother     Hypertension Maternal Grandfather     No Known Problems Paternal Grandmother     Lung cancer Paternal Grandfather      Past Surgical History:   Procedure Laterality Date    NEPHRECTOMY  12/2020    s/p renal cancer    SPINAL FUSION  10/2020    s/p T-11-L2    WISDOM TOOTH EXTRACTION       Social History     Socioeconomic History    Marital status:      Spouse name: Not on file    Number of children: 3    Years of education: Not on file    Highest education level: Not on file   Occupational History    Occupation: Disability   Tobacco Use    Smoking status: Never    Smokeless tobacco: Never   Vaping Use    Vaping Use: Never used   Substance and Sexual Activity    Alcohol use: Yes     Comment: socially    Drug use: No    Sexual activity: Yes     Partners: Male   Other Topics Concern    Not on file   Social History Narrative    Not on file     Social Determinants of Health     Financial Resource Strain: Not on file   Food Insecurity: Not on file   Transportation Needs: Not on file   Physical Activity: Not  "on file   Stress: Not on file   Social Connections: Not on file   Intimate Partner Violence: Not on file   Housing Stability: Not on file     Allergies   Allergen Reactions    Iodinated Contrast Media      Sphincter of oddi       Objective   Visit Vitals  /78   Pulse 67   Temp 36.1 °C (97 °F) (Temporal)   Resp 16   Ht 1.753 m (5' 9\")   Wt 57.2 kg (126 lb) Comment: without shoes   SpO2 97%   BMI 18.61 kg/m²     Physical Exam  Vitals and nursing note reviewed.   Constitutional:       Appearance: Normal appearance.   HENT:      Right Ear: Tympanic membrane normal. There is no impacted cerumen.      Left Ear: Tympanic membrane normal. There is no impacted cerumen.      Nose: Nose normal. No rhinorrhea.      Mouth/Throat:      Mouth: Mucous membranes are moist.      Pharynx: Oropharynx is clear. No posterior oropharyngeal erythema.   Neck:      Comments: L enlarged anterior lymph node felt non tender cervical region  Cardiovascular:      Rate and Rhythm: Normal rate and regular rhythm.      Heart sounds: No murmur heard.  Pulmonary:      Effort: Pulmonary effort is normal.      Breath sounds: Normal breath sounds. No wheezing.   Abdominal:      General: Bowel sounds are normal.      Palpations: Abdomen is soft.      Tenderness: There is no abdominal tenderness.   Musculoskeletal:         General: Normal range of motion.      Cervical back: Normal range of motion and neck supple.   Skin:     General: Skin is warm.      Findings: No rash.   Neurological:      General: No focal deficit present.      Mental Status: She is alert and oriented to person, place, and time.      Cranial Nerves: No cranial nerve deficit.   Psychiatric:         Mood and Affect: Mood normal.         Behavior: Behavior normal.         Assessment/Plan   Problem List Items Addressed This Visit          Genitourinary    Renal cell carcinoma of left kidney (CMS/HCC)     Stable. Seeing specialists.          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 We discussed cont with diet and exercise. Holding on fasting labs b/c she gets labs done by specialists every 3wks and won't be interested in taking another med like statin at this time. UTD with gyn care/mammo. UTD with eye and dental exam. She will set up colonoscopy soon. She will cont current meds and specialists. Using imitrex as needed and refilled today.     Lauren Green, DO  5/23/2024

## 2024-05-23 NOTE — PATIENT INSTRUCTIONS
[facility]  STAN ORTEGA; JADE JONES; FARIDEH PURVIS; ERIC HERRING; ROSENDO PISANO; AALIYAH WEAVER; PETE DOWNEY; PRADIP GAVIRIA; CARLY BASS  [address] [place]    [address], [place], [place]  [phone]        [facility]      PAMELA MICHELE; CHIRAG KWOK; ANIKET PUENTES; ODESSA ARREOLA;       KARON MORALES; PAMELA SOLANO      [address],  [place], [place]   [phone]        [facility]      TESSA MARKS; ROSALVA ROCK     СЕРГЕЙ WARONKER; ANNA LICONA      [address] [place] [phone]        [facility] DELPHINE MORSE; SANDRA ACOSTA; DONNA ISAAC; ZAIDA BIRD; UMA LITTLE; CARLY CASON; DANITZA ESPOSITO; TAVON PÉREZ; COLTEN RAYMOND; PAMELA LOPEZ; JORGE VILLA; RAFAEL QUINN; CHUCKY CONN   [address] [place] [phone]            [facility]       ROSALVA PEPE; JYOTI SHORT;      JOHNNY CONTRERAS; HARJINDER YUN PA-C       [address] [phone]         [facility]       EVARISTO TAYDAS       [address] [place] [address] [phone]          [facility]       CHERELLE SIMPSON; BRIAN GAN;      NICK COBOS       [address] [place] [address] DR. COON [phone]        [facility] GOLDBERG, HIBBARD, ZAVALA, MUSSA, DEPALMA, WALTER     [address]    [phone]

## 2024-10-29 ENCOUNTER — TELEPHONE (OUTPATIENT)
Dept: PRIMARY CARE | Facility: CLINIC | Age: 49
End: 2024-10-29
Payer: COMMERCIAL

## 2024-10-29 NOTE — TELEPHONE ENCOUNTER
Pt LVM on WP line on 10/29/24 @ 2:51 pm requesting if Dr Green will take on her daughter as a NP as she will be aging out of Peds.

## 2024-10-30 NOTE — TELEPHONE ENCOUNTER
LVM informing patient that we are not currently accepting new patients. Provided recommendations for other local [facility] offices in the area.